# Patient Record
Sex: MALE | Race: WHITE | NOT HISPANIC OR LATINO | Employment: FULL TIME | URBAN - METROPOLITAN AREA
[De-identification: names, ages, dates, MRNs, and addresses within clinical notes are randomized per-mention and may not be internally consistent; named-entity substitution may affect disease eponyms.]

---

## 2021-04-14 ENCOUNTER — OFFICE VISIT (OUTPATIENT)
Dept: URGENT CARE | Facility: CLINIC | Age: 26
End: 2021-04-14
Payer: COMMERCIAL

## 2021-04-14 VITALS
HEART RATE: 94 BPM | TEMPERATURE: 98.4 F | RESPIRATION RATE: 16 BRPM | OXYGEN SATURATION: 99 % | HEIGHT: 67 IN | DIASTOLIC BLOOD PRESSURE: 64 MMHG | SYSTOLIC BLOOD PRESSURE: 100 MMHG | BODY MASS INDEX: 21.97 KG/M2 | WEIGHT: 140 LBS

## 2021-04-14 DIAGNOSIS — B35.3 TINEA PEDIS OF BOTH FEET: ICD-10-CM

## 2021-04-14 DIAGNOSIS — S90.829A BLISTER OF FOOT, UNSPECIFIED LATERALITY, INITIAL ENCOUNTER: ICD-10-CM

## 2021-04-14 PROCEDURE — 99202 OFFICE O/P NEW SF 15 MIN: CPT | Performed by: NURSE PRACTITIONER

## 2021-04-14 NOTE — LETTER
April 14, 2021    To Whom It May Concern:         This is confirmation that Don Chavira attended his scheduled appointment with KIRILL Nagel on 4/14/21.    Please excuse him from work today.     Sincerely,          JAX Nagel.  [phone]

## 2021-04-15 NOTE — PROGRESS NOTES
Subjective:      Don Chavira is a 25 y.o. male who presents with Foot Burn (both feet, swollen and hurts, redness and bad oddor, x3mths.)            Foot Problem  This is a new problem. Episode onset: pt reports new onset of burning rash and blisters to both feet that started 3 months ago. He states this gets worse when his feet are hot and moist. States it is now starting to be painful to walk or stand for long periods. Treatments tried: states he tried changing his socks several times per day. The treatment provided no relief.       Review of Systems   Skin:        Rash to both feet   All other systems reviewed and are negative.    History reviewed. No pertinent past medical history. History reviewed. No pertinent surgical history.   Social History     Socioeconomic History   • Marital status: Single     Spouse name: Not on file   • Number of children: Not on file   • Years of education: Not on file   • Highest education level: Not on file   Occupational History   • Not on file   Tobacco Use   • Smoking status: Never Smoker   • Smokeless tobacco: Never Used   Substance and Sexual Activity   • Alcohol use: Not on file   • Drug use: Not on file   • Sexual activity: Not on file   Other Topics Concern   • Not on file   Social History Narrative   • Not on file     Social Determinants of Health     Financial Resource Strain:    • Difficulty of Paying Living Expenses:    Food Insecurity:    • Worried About Running Out of Food in the Last Year:    • Ran Out of Food in the Last Year:    Transportation Needs:    • Lack of Transportation (Medical):    • Lack of Transportation (Non-Medical):    Physical Activity:    • Days of Exercise per Week:    • Minutes of Exercise per Session:    Stress:    • Feeling of Stress :    Social Connections:    • Frequency of Communication with Friends and Family:    • Frequency of Social Gatherings with Friends and Family:    • Attends Denominational Services:    • Active Member of Clubs or  "Organizations:    • Attends Club or Organization Meetings:    • Marital Status:    Intimate Partner Violence:    • Fear of Current or Ex-Partner:    • Emotionally Abused:    • Physically Abused:    • Sexually Abused:           Objective:     /64 (BP Location: Left arm, Patient Position: Sitting, BP Cuff Size: Adult)   Pulse 94   Temp 36.9 °C (98.4 °F) (Temporal)   Resp 16   Ht 1.702 m (5' 7\")   Wt 63.5 kg (140 lb)   SpO2 99%   BMI 21.93 kg/m²      Physical Exam  Vitals and nursing note reviewed.   Constitutional:       Appearance: Normal appearance. He is well-developed.   HENT:      Head: Normocephalic and atraumatic.      Right Ear: External ear normal.      Left Ear: External ear normal.      Nose: Nose normal.   Eyes:      Conjunctiva/sclera: Conjunctivae normal.      Pupils: Pupils are equal, round, and reactive to light.   Cardiovascular:      Rate and Rhythm: Normal rate and regular rhythm.   Pulmonary:      Effort: Pulmonary effort is normal.   Musculoskeletal:         General: Normal range of motion.      Cervical back: Normal range of motion.        Feet:    Skin:     General: Skin is warm and dry.      Capillary Refill: Capillary refill takes less than 2 seconds.   Neurological:      General: No focal deficit present.      Mental Status: He is alert and oriented to person, place, and time. Mental status is at baseline.   Psychiatric:         Mood and Affect: Mood normal.         Behavior: Behavior normal.         Thought Content: Thought content normal.                 Assessment/Plan:        1. Blister of foot, unspecified laterality, initial encounter    2. Tinea pedis of both feet    OTC lotrimin BID for 2-3 weeks  OTC lotrimin powder to shoes  Wear breathable socks, no wool  Keep feet open to air as often as possible  Must avoid excessive moisture to promote healing  Work note provided  Supportive care, differential diagnoses, and indications for immediate follow-up discussed with " patient.    Pathogenesis of diagnosis discussed including typical length and natural progression.      Instructed to return to UC or nearest emergency department if symptoms fail to improve, for any change in condition, further concerns, or new concerning symptoms.  Patient states understanding of the plan of care and discharge instructions.

## 2022-05-19 ENCOUNTER — HOSPITAL ENCOUNTER (OUTPATIENT)
Dept: LAB | Facility: MEDICAL CENTER | Age: 27
End: 2022-05-19
Attending: NURSE PRACTITIONER
Payer: COMMERCIAL

## 2022-05-19 ENCOUNTER — OFFICE VISIT (OUTPATIENT)
Dept: MEDICAL GROUP | Facility: PHYSICIAN GROUP | Age: 27
End: 2022-05-19
Payer: COMMERCIAL

## 2022-05-19 VITALS
BODY MASS INDEX: 23.07 KG/M2 | HEIGHT: 67 IN | WEIGHT: 147 LBS | OXYGEN SATURATION: 97 % | RESPIRATION RATE: 18 BRPM | DIASTOLIC BLOOD PRESSURE: 82 MMHG | HEART RATE: 81 BPM | SYSTOLIC BLOOD PRESSURE: 112 MMHG | TEMPERATURE: 98.1 F

## 2022-05-19 DIAGNOSIS — F10.10 ALCOHOL USE DISORDER, MILD, ABUSE: ICD-10-CM

## 2022-05-19 DIAGNOSIS — E29.1 HYPOGONADISM, MALE: ICD-10-CM

## 2022-05-19 DIAGNOSIS — Z00.00 WELL ADULT EXAM: ICD-10-CM

## 2022-05-19 DIAGNOSIS — N62 GYNECOMASTIA, MALE: ICD-10-CM

## 2022-05-19 DIAGNOSIS — N36.9 URETHRAL DISORDER, UNSPECIFIED: ICD-10-CM

## 2022-05-19 DIAGNOSIS — Q21.0 VSD (VENTRICULAR SEPTAL DEFECT): ICD-10-CM

## 2022-05-19 DIAGNOSIS — Q56.4: ICD-10-CM

## 2022-05-19 DIAGNOSIS — Q56.4: Primary | ICD-10-CM

## 2022-05-19 DIAGNOSIS — Z13.79 GENETIC SCREENING: ICD-10-CM

## 2022-05-19 LAB
ALBUMIN SERPL BCP-MCNC: 4.8 G/DL (ref 3.2–4.9)
ALBUMIN/GLOB SERPL: 1.6 G/DL
ALP SERPL-CCNC: 62 U/L (ref 30–99)
ALT SERPL-CCNC: 12 U/L (ref 2–50)
ANION GAP SERPL CALC-SCNC: 13 MMOL/L (ref 7–16)
AST SERPL-CCNC: 21 U/L (ref 12–45)
BASOPHILS # BLD AUTO: 0.9 % (ref 0–1.8)
BASOPHILS # BLD: 0.06 K/UL (ref 0–0.12)
BILIRUB SERPL-MCNC: 0.7 MG/DL (ref 0.1–1.5)
BUN SERPL-MCNC: 12 MG/DL (ref 8–22)
CALCIUM SERPL-MCNC: 9.5 MG/DL (ref 8.5–10.5)
CHLORIDE SERPL-SCNC: 106 MMOL/L (ref 96–112)
CO2 SERPL-SCNC: 23 MMOL/L (ref 20–33)
CREAT SERPL-MCNC: 0.7 MG/DL (ref 0.5–1.4)
EOSINOPHIL # BLD AUTO: 0.09 K/UL (ref 0–0.51)
EOSINOPHIL NFR BLD: 1.3 % (ref 0–6.9)
ERYTHROCYTE [DISTWIDTH] IN BLOOD BY AUTOMATED COUNT: 40 FL (ref 35.9–50)
ESTRADIOL SERPL-MCNC: 9.3 PG/ML
FSH SERPL-ACNC: 6.8 MIU/ML (ref 1.5–12.4)
GFR SERPLBLD CREATININE-BSD FMLA CKD-EPI: 130 ML/MIN/1.73 M 2
GLOBULIN SER CALC-MCNC: 3 G/DL (ref 1.9–3.5)
GLUCOSE SERPL-MCNC: 88 MG/DL (ref 65–99)
HCT VFR BLD AUTO: 43.3 % (ref 42–52)
HGB BLD-MCNC: 15.2 G/DL (ref 14–18)
IMM GRANULOCYTES # BLD AUTO: 0.02 K/UL (ref 0–0.11)
IMM GRANULOCYTES NFR BLD AUTO: 0.3 % (ref 0–0.9)
LH SERPL-ACNC: 4.2 IU/L (ref 1.7–8.6)
LYMPHOCYTES # BLD AUTO: 2.05 K/UL (ref 1–4.8)
LYMPHOCYTES NFR BLD: 30.7 % (ref 22–41)
MCH RBC QN AUTO: 32.7 PG (ref 27–33)
MCHC RBC AUTO-ENTMCNC: 35.1 G/DL (ref 33.7–35.3)
MCV RBC AUTO: 93.1 FL (ref 81.4–97.8)
MONOCYTES # BLD AUTO: 0.54 K/UL (ref 0–0.85)
MONOCYTES NFR BLD AUTO: 8.1 % (ref 0–13.4)
NEUTROPHILS # BLD AUTO: 3.91 K/UL (ref 1.82–7.42)
NEUTROPHILS NFR BLD: 58.7 % (ref 44–72)
NRBC # BLD AUTO: 0 K/UL
NRBC BLD-RTO: 0 /100 WBC
PLATELET # BLD AUTO: 343 K/UL (ref 164–446)
PMV BLD AUTO: 9.4 FL (ref 9–12.9)
POTASSIUM SERPL-SCNC: 4.3 MMOL/L (ref 3.6–5.5)
PROLACTIN SERPL-MCNC: 9.64 NG/ML (ref 2.1–17.7)
PROT SERPL-MCNC: 7.8 G/DL (ref 6–8.2)
RBC # BLD AUTO: 4.65 M/UL (ref 4.7–6.1)
SODIUM SERPL-SCNC: 142 MMOL/L (ref 135–145)
TSH SERPL DL<=0.005 MIU/L-ACNC: 1.55 UIU/ML (ref 0.38–5.33)
WBC # BLD AUTO: 6.7 K/UL (ref 4.8–10.8)

## 2022-05-19 PROCEDURE — 83002 ASSAY OF GONADOTROPIN (LH): CPT

## 2022-05-19 PROCEDURE — 85025 COMPLETE CBC W/AUTO DIFF WBC: CPT

## 2022-05-19 PROCEDURE — 82670 ASSAY OF TOTAL ESTRADIOL: CPT

## 2022-05-19 PROCEDURE — 84443 ASSAY THYROID STIM HORMONE: CPT

## 2022-05-19 PROCEDURE — 80053 COMPREHEN METABOLIC PANEL: CPT

## 2022-05-19 PROCEDURE — 84402 ASSAY OF FREE TESTOSTERONE: CPT

## 2022-05-19 PROCEDURE — 99204 OFFICE O/P NEW MOD 45 MIN: CPT | Performed by: NURSE PRACTITIONER

## 2022-05-19 PROCEDURE — 36415 COLL VENOUS BLD VENIPUNCTURE: CPT

## 2022-05-19 PROCEDURE — 84146 ASSAY OF PROLACTIN: CPT

## 2022-05-19 PROCEDURE — 84270 ASSAY OF SEX HORMONE GLOBUL: CPT

## 2022-05-19 PROCEDURE — 83001 ASSAY OF GONADOTROPIN (FSH): CPT

## 2022-05-19 PROCEDURE — 84403 ASSAY OF TOTAL TESTOSTERONE: CPT

## 2022-05-19 ASSESSMENT — PATIENT HEALTH QUESTIONNAIRE - PHQ9: CLINICAL INTERPRETATION OF PHQ2 SCORE: 0

## 2022-05-19 NOTE — ASSESSMENT & PLAN NOTE
Patient was born with congenital VSD.  He states he had this repaired when he was approximately 8 years old.  He has not had any issues since that time.  He denies chest pain, shortness of breath, fatigue, or exercise intolerance.  He does need to be followed by cardiology, and referral was placed for him to establish today.

## 2022-05-19 NOTE — ASSESSMENT & PLAN NOTE
Chronic condition, unstable.  Patient states that he has been drinking more than he would like to.  Occasionally he drinks 5 shots in 1 day.  He reports he has been sober for approximately 8 days as of today.  He has been through rehab in 2017.  He was sober for 7 to 8 months afterward.  He has done AA, as well as addiction counseling, and did not find much value in it.  Patient does want to consider beginning Antabuse.  Discussed with patient that could consider beginning him on Antabuse, however I would like to get labs on him prior to beginning this medication.  Patient will get his labs completed, and return for follow-up next week.

## 2022-05-19 NOTE — ASSESSMENT & PLAN NOTE
Chronic and ongoing.  Patient reports that he used to be on testosterone replacement therapy, and did feel well on this.  He discontinued testosterone replacement when he was 18.  He has not had his testosterone level checked in a couple of years, and order was placed to recheck this today.  Patient would like to be referred to endocrine, and referral was placed today.

## 2022-05-21 LAB
SHBG SERPL-SCNC: 39 NMOL/L (ref 17–56)
TESTOST FREE MFR SERPL: 1.5 % (ref 1.6–2.9)
TESTOST FREE SERPL-MCNC: 7 PG/ML (ref 47–244)
TESTOST SERPL-MCNC: 43 NG/DL (ref 300–1080)

## 2022-05-25 NOTE — ASSESSMENT & PLAN NOTE
Chronic condition, ongoing.  Pt born with congenital intersex.  He has congenital hypospadius, with 9 corrective surgeries before the age of 3.  He also had reconstructive surgery on his penis at age 15 which included urethral revision, and testicle prosthesis.  He denies any problems since that time.  He has no complaints relative to urination at this time.  He does need to establish with specialty, and referral to urology was placed for him today.

## 2022-05-26 PROBLEM — N62 GYNECOMASTIA, MALE: Status: ACTIVE | Noted: 2022-05-26

## 2022-05-26 NOTE — ASSESSMENT & PLAN NOTE
Chronic condition, ongoing.  Patient was born with congenital intersex.  Patient reports that at that time, his parents chose for him to be male.  He was born with 1 ovary, which has been removed when he was an infant.  Has undergone multiple surgeries including 9 hypospadias surgeries before the age of 3.  At age 8 he had an overgrowth in the left chamber of his heart, and a VSD repair.  He also underwent double mastectomy at age 13.  He did did have corrective surgery on his penis when he was 15, with revision of part of his urethra and testicle prosthesis.  Patient does need to be followed by specialties including urology, endocrinology, and cardiology.  All of these referrals were placed for him today.

## 2022-05-26 NOTE — PROGRESS NOTES
Subjective:     CC: The primary encounter diagnosis was Intersex disorder. Diagnoses of Hypogonadism, male, Urethral disorder, unspecified, VSD (ventricular septal defect), Alcohol use disorder, mild, abuse, Gynecomastia, male, Genetic screening, and Well adult exam were also pertinent to this visit.      HPI:   Don is a new patient to me today wanting to establish care.  Patient recently relocated to [place] from [place].  His past medical history is significant for congenital intersex disorder with multiple corrective surgeries, as well as congenital VSD, s/p repair.  We discussed the following problems:    1. Intersex disorder  Chronic and ongoing.  Patient here for evaluation today.    2. Hypogonadism, male  Chronic condition, stable. Patient here for evaluation today.    3. Urethral disorder, unspecified  Chronic condition, stable. Patient is here for evaluation today.    4. VSD (ventricular septal defect)  Chronic condition, stable. Patient is here for evaluation today.    5. Alcohol use disorder, mild, abuse  Acute condition, unstable. Patient here for evaluation today.    6. Gynecomastia, male  Acute condition, unstable. Patient here for evaluation today.     7. Genetic screening  Patient would like to participate in the [facility].  Referral was placed today.    8. Well adult exam  Labs ordered in anticipation of well adult exam.        History reviewed. No pertinent past medical history.    Social History     Tobacco Use   • Smoking status: Current Every Day Smoker     Packs/day: 0.25     Types: Cigarettes     Start date: 1/1/2017   • Smokeless tobacco: Never Used   Vaping Use   • Vaping Use: Never used   Substance Use Topics   • Alcohol use: Yes     Alcohol/week: 2.4 oz     Types: 4 Shots of liquor per week     Comment: occ   • Drug use: Never       No current [facility]-ordered outpatient medications on file.     No current [facility]-ordered facility-administered medications on file.  "      Allergies:  Patient has no known allergies.    Health Maintenance: Completed    ROS:  Gen: no fevers/chills, no changes in weight  Eyes: no changes in vision  ENT: no sore throat, no hearing loss, no bloody nose  Pulm: no sob, no cough  CV: no chest pain, no palpitations  GI: no nausea/vomiting, no diarrhea  : no dysuria  MSk: no myalgias  Skin: no rash  Neuro: no headaches, no numbness/tingling  Heme/Lymph: no easy bruising      Objective:       Exam:  /82 (BP Location: Left arm, Patient Position: Sitting, BP Cuff Size: Adult)   Pulse 81   Temp 36.7 °C (98.1 °F) (Temporal)   Resp 18   Ht 1.702 m (5' 7\")   Wt 66.7 kg (147 lb)   SpO2 97%   BMI 23.02 kg/m²  Body mass index is 23.02 kg/m².    Gen: Alert and oriented, No apparent distress.  Neck: Neck is supple without lymphadenopathy.  No carotid bruits.  Lungs: Normal effort, CTA bilaterally, no wheezes, rhonchi, or rales  CV: Regular rate and rhythm. No murmurs, rubs, or gallops.  Ext: No clubbing, cyanosis, edema.    Labs: None    Assessment & Plan:     26 y.o. male with the following -     Hypogonadism, male  Chronic and ongoing.  Patient reports that he used to be on testosterone replacement therapy, and did feel well on this.  He discontinued testosterone replacement when he was 18.  He has not had his testosterone level checked in a couple of years, and order was placed to recheck this today.  Patient would like to be referred to endocrine, and referral was placed today.        VSD (ventricular septal defect)  Patient was born with congenital VSD.  He states he had this repaired when he was approximately 8 years old.  He has not had any issues since that time.  He denies chest pain, shortness of breath, fatigue, or exercise intolerance.  He does need to be followed by cardiology, and referral was placed for him to establish today.      Alcohol use disorder, mild, abuse  Chronic condition, unstable.  Patient states that he has been drinking " more than he would like to.  Occasionally he drinks 5 shots in 1 day.  He reports he has been sober for approximately 8 days as of today.  He has been through rehab in 2017.  He was sober for 7 to 8 months afterward.  He has done AA, as well as addiction counseling, and did not find much value in it.  Patient does want to consider beginning Antabuse.  Discussed with patient that could consider beginning him on Antabuse, however I would like to get labs on him prior to beginning this medication.  Patient will get his labs completed, and return for follow-up next week.      Urethral disorder, unspecified  Chronic condition, ongoing.  Pt born with congenital intersex.  He has congenital hypospadius, with 9 corrective surgeries before the age of 3.  He also had reconstructive surgery on his penis at age 15 which included urethral revision, and testicle prosthesis.  He denies any problems since that time.  He has no complaints relative to urination at this time.  He does need to establish with specialty, and referral to urology was placed for him today.    Intersex disorder  Chronic condition, ongoing.  Patient was born with congenital intersex.  Patient reports that at that time, his parents chose for him to be male.  He was born with 1 ovary, which has been removed when he was an infant.  Has undergone multiple surgeries including 9 hypospadias surgeries before the age of 3.  At age 8 he had an overgrowth in the left chamber of his heart, and a VSD repair.  He also underwent double mastectomy at age 13.  He did did have corrective surgery on his penis when he was 15, with revision of part of his urethra and testicle prosthesis.  Patient does need to be followed by specialties including urology, endocrinology, and cardiology.  All of these referrals were placed for him today.    Gynecomastia, male  Acute condition, unstable.  Patient had a double mastectomy at the age of 13 due to congenital intersex disorder.  He has  noticed that he is feeling hard nodules and tissue irregularities, which he is concerned about.  On examination today, patient does have a palpable mass underneath the nipple of the right breast as well as dense feeling tissue.  The left breast has some small palpable masses with dense tissue as well.  Discussed further workup with breast imaging with patient and he would like to proceed.  Follow up will be based on results.       Orders Placed This Encounter   • US-BREAST BILAT-COMPLETE   • Testosterone, Free & Total, Adult Male (w/SHBG)   • CBC WITH DIFFERENTIAL   • Comp Metabolic Panel   • PROLACTIN   • FSH/LH   • ESTRADIOL   • TSH WITH REFLEX TO FT4   • Referral to Genetic Research Studies   • Referral to Endocrinology   • REFERRAL TO CARDIOLOGY   • Referral to Urology          Return in about 1 week (around 5/26/2022).    I have placed the below orders and discussed them with an approved delegating provider.  The MA is performing the below orders under the direction of Mary Denton MD.    Please note that this dictation was created using voice recognition software. I have made every reasonable attempt to correct obvious errors, but I expect that there are errors of grammar and possibly content that I did not discover before finalizing the note.

## 2022-05-26 NOTE — ASSESSMENT & PLAN NOTE
Acute condition, unstable.  Patient had a double mastectomy at the age of 13 due to congenital intersex disorder.  He has noticed that he is feeling hard nodules and tissue irregularities, which he is concerned about.  On examination today, patient does have a palpable mass underneath the nipple of the right breast as well as dense feeling tissue.  The left breast has some small palpable masses with dense tissue as well.  Discussed further workup with breast imaging with patient and he would like to proceed.  Follow up will be based on results.

## 2022-06-06 ENCOUNTER — RESEARCH ENCOUNTER (OUTPATIENT)
Dept: RESEARCH | Facility: WORKSITE | Age: 27
End: 2022-06-06
Attending: NURSE PRACTITIONER
Payer: COMMERCIAL

## 2022-06-06 DIAGNOSIS — Z00.6 RESEARCH STUDY PATIENT: ICD-10-CM

## 2022-06-16 ENCOUNTER — OFFICE VISIT (OUTPATIENT)
Dept: MEDICAL GROUP | Facility: PHYSICIAN GROUP | Age: 27
End: 2022-06-16
Payer: COMMERCIAL

## 2022-06-16 VITALS
BODY MASS INDEX: 23.29 KG/M2 | DIASTOLIC BLOOD PRESSURE: 70 MMHG | RESPIRATION RATE: 16 BRPM | SYSTOLIC BLOOD PRESSURE: 100 MMHG | WEIGHT: 148.4 LBS | OXYGEN SATURATION: 98 % | HEIGHT: 67 IN | HEART RATE: 92 BPM | TEMPERATURE: 98.4 F

## 2022-06-16 DIAGNOSIS — E29.1 HYPOGONADISM, MALE: ICD-10-CM

## 2022-06-16 DIAGNOSIS — F10.10 ALCOHOL USE DISORDER, MILD, ABUSE: Primary | ICD-10-CM

## 2022-06-16 PROCEDURE — 99214 OFFICE O/P EST MOD 30 MIN: CPT | Performed by: NURSE PRACTITIONER

## 2022-06-16 RX ORDER — DISULFIRAM 250 MG/1
250 TABLET ORAL DAILY
Qty: 30 TABLET | Refills: 2 | Status: SHIPPED | OUTPATIENT
Start: 2022-06-16 | End: 2022-09-19 | Stop reason: SDUPTHER

## 2022-06-16 ASSESSMENT — FIBROSIS 4 INDEX: FIB4 SCORE: 0.46

## 2022-06-16 NOTE — ASSESSMENT & PLAN NOTE
Chronic and ongoing.  Patient reports that he has continued to drink more alcohol than he would like.  He would like to start on disulfiram 250 mg daily.  Education provided on use of this medication, as well as the need to abstain from ETOH for at least 12 hours prior to beginning the medication.  He was educated about watching for added ETOH to basic products such as mouth wash, food additives, etc.  Patient will follow up in three months to see how he is doing.

## 2022-06-17 NOTE — PROGRESS NOTES
"Subjective:     CC: The primary encounter diagnosis was Alcohol use disorder, mild, abuse. A diagnosis of Hypogonadism, male was also pertinent to this visit.      HPI:   Don is an established patient of [facility] here for follow-up.  We discussed the following problems:    1. Alcohol use disorder, mild, abuse  Chronic condition, unstable.  Patient here for follow up.    2. Hypogonadism, male  Chronic condition, unstable. Patient is here for follow up.       History reviewed. No pertinent past medical history.    Social History     Tobacco Use   • Smoking status: Current Every Day Smoker     Packs/day: 0.25     Types: Cigarettes     Start date: 1/1/2017   • Smokeless tobacco: Never Used   Vaping Use   • Vaping Use: Never used   Substance Use Topics   • Alcohol use: Yes     Alcohol/week: 50.4 oz     Types: 84 Shots of liquor per week   • Drug use: Never       Current Outpatient Medications Ordered in Epic   Medication Sig Dispense Refill   • disulfiram (ANTABUSE) 250 MG Tab Take 1 Tablet by mouth every day. 30 Tablet 2     No current Epic-ordered facility-administered medications on file.       Allergies:  Patient has no known allergies.    Health Maintenance: Completed    ROS:  Gen: no fevers/chills, no changes in weight  Eyes: no changes in vision  ENT: no sore throat, no hearing loss, no bloody nose  Pulm: no sob, no cough  CV: no chest pain, no palpitations  GI: no nausea/vomiting, no diarrhea  : no dysuria  MSk: no myalgias  Skin: no rash  Neuro: no headaches, no numbness/tingling  Heme/Lymph: no easy bruising      Objective:       Exam:  /70 (BP Location: Right arm, Patient Position: Sitting, BP Cuff Size: Adult)   Pulse 92   Temp 36.9 °C (98.4 °F) (Temporal)   Resp 16   Ht 1.702 m (5' 7\")   Wt 67.3 kg (148 lb 6.4 oz)   SpO2 98%   BMI 23.24 kg/m²  Body mass index is 23.24 kg/m².    Gen: Alert and oriented, No apparent distress.  Neck: Neck is supple without lymphadenopathy.    Ext: No clubbing, " cyanosis, edema.    Labs: Dated: 5/19/2022, discussed results today and all questions answered.  Assessment & Plan:     26 y.o. male with the following -     Alcohol use disorder, mild, abuse  Chronic and ongoing.  Patient reports that he has continued to drink more alcohol than he would like.  He would like to start on disulfiram 250 mg daily.  Education provided on use of this medication, as well as the need to abstain from ETOH for at least 12 hours prior to beginning the medication.  He was educated about watching for added ETOH to basic products such as mouth wash, food additives, etc.  Patient will follow up in three months to see how he is doing.     Hypogonadism, male  Chronic condition, stable.  Discussed recent low testosterone levels with patient.  He does have an appointment with Mahendra at [facility], and he will discuss testosterone replacement management with him.  He will continue to follow with specialty.      Orders Placed This Encounter   • disulfiram (ANTABUSE) 250 MG Tab          Return in about 3 months (around 9/16/2022).    I have placed the below orders and discussed them with an approved delegating provider.  The MA is performing the below orders under the direction of Mary Denton MD.    Please note that this dictation was created using voice recognition software. I have made every reasonable attempt to correct obvious errors, but I expect that there are errors of grammar and possibly content that I did not discover before finalizing the note.

## 2022-06-17 NOTE — ASSESSMENT & PLAN NOTE
Chronic condition, stable.  Discussed recent low testosterone levels with patient.  He does have an appointment with Mahendra at [facility], and he will discuss testosterone replacement management with him.  He will continue to follow with specialty.

## 2022-08-29 ENCOUNTER — HOSPITAL ENCOUNTER (OUTPATIENT)
Dept: LAB | Facility: MEDICAL CENTER | Age: 27
End: 2022-08-29
Attending: PHYSICIAN ASSISTANT
Payer: COMMERCIAL

## 2022-08-29 LAB
BASOPHILS # BLD AUTO: 0.6 % (ref 0–1.8)
BASOPHILS # BLD: 0.06 K/UL (ref 0–0.12)
EOSINOPHIL # BLD AUTO: 0.12 K/UL (ref 0–0.51)
EOSINOPHIL NFR BLD: 1.2 % (ref 0–6.9)
ERYTHROCYTE [DISTWIDTH] IN BLOOD BY AUTOMATED COUNT: 40.1 FL (ref 35.9–50)
HCT VFR BLD AUTO: 43.4 % (ref 42–52)
HGB BLD-MCNC: 14.9 G/DL (ref 14–18)
IMM GRANULOCYTES # BLD AUTO: 0.03 K/UL (ref 0–0.11)
IMM GRANULOCYTES NFR BLD AUTO: 0.3 % (ref 0–0.9)
LYMPHOCYTES # BLD AUTO: 2.52 K/UL (ref 1–4.8)
LYMPHOCYTES NFR BLD: 25.5 % (ref 22–41)
MCH RBC QN AUTO: 32.3 PG (ref 27–33)
MCHC RBC AUTO-ENTMCNC: 34.3 G/DL (ref 33.7–35.3)
MCV RBC AUTO: 93.9 FL (ref 81.4–97.8)
MONOCYTES # BLD AUTO: 0.78 K/UL (ref 0–0.85)
MONOCYTES NFR BLD AUTO: 7.9 % (ref 0–13.4)
NEUTROPHILS # BLD AUTO: 6.37 K/UL (ref 1.82–7.42)
NEUTROPHILS NFR BLD: 64.5 % (ref 44–72)
NRBC # BLD AUTO: 0 K/UL
NRBC BLD-RTO: 0 /100 WBC
PLATELET # BLD AUTO: 281 K/UL (ref 164–446)
PMV BLD AUTO: 10.3 FL (ref 9–12.9)
RBC # BLD AUTO: 4.62 M/UL (ref 4.7–6.1)
TESTOST SERPL-MCNC: 740 NG/DL (ref 175–781)
WBC # BLD AUTO: 9.9 K/UL (ref 4.8–10.8)

## 2022-08-29 PROCEDURE — 36415 COLL VENOUS BLD VENIPUNCTURE: CPT

## 2022-08-29 PROCEDURE — 85025 COMPLETE CBC W/AUTO DIFF WBC: CPT

## 2022-08-29 PROCEDURE — 84403 ASSAY OF TOTAL TESTOSTERONE: CPT

## 2022-08-30 ENCOUNTER — OFFICE VISIT (OUTPATIENT)
Dept: CARDIOLOGY | Facility: MEDICAL CENTER | Age: 27
End: 2022-08-30
Attending: NURSE PRACTITIONER
Payer: COMMERCIAL

## 2022-08-30 VITALS
BODY MASS INDEX: 23.54 KG/M2 | WEIGHT: 150 LBS | HEIGHT: 67 IN | DIASTOLIC BLOOD PRESSURE: 60 MMHG | SYSTOLIC BLOOD PRESSURE: 100 MMHG | HEART RATE: 72 BPM | RESPIRATION RATE: 16 BRPM | OXYGEN SATURATION: 96 %

## 2022-08-30 DIAGNOSIS — Q21.0 VSD (VENTRICULAR SEPTAL DEFECT): ICD-10-CM

## 2022-08-30 DIAGNOSIS — R00.2 PALPITATIONS: ICD-10-CM

## 2022-08-30 LAB — EKG IMPRESSION: NORMAL

## 2022-08-30 PROCEDURE — 99204 OFFICE O/P NEW MOD 45 MIN: CPT | Mod: 25 | Performed by: INTERNAL MEDICINE

## 2022-08-30 PROCEDURE — 93000 ELECTROCARDIOGRAM COMPLETE: CPT | Performed by: INTERNAL MEDICINE

## 2022-08-30 RX ORDER — METHOCARBAMOL 750 MG/1
TABLET, FILM COATED ORAL
COMMUNITY
Start: 2022-07-01 | End: 2022-08-30

## 2022-08-30 RX ORDER — TRIAMCINOLONE ACETONIDE 1 MG/G
OINTMENT TOPICAL
COMMUNITY

## 2022-08-30 RX ORDER — TESTOSTERONE CYPIONATE 200 MG/ML
1 INJECTION, SOLUTION INTRAMUSCULAR
COMMUNITY

## 2022-08-30 ASSESSMENT — ENCOUNTER SYMPTOMS
LIGHT-HEADEDNESS: 0
BACK PAIN: 1
NECK PAIN: 1
PALPITATIONS: 1
PARESTHESIAS: 0
LOSS OF BALANCE: 0
PND: 0
SYNCOPE: 0
NIGHT SWEATS: 0
ORTHOPNEA: 0
SHORTNESS OF BREATH: 0
MYALGIAS: 0
HEADACHES: 1
SORE THROAT: 0
COUGH: 0
FEVER: 0
NUMBNESS: 0
EXCESSIVE DAYTIME SLEEPINESS: 0
WHEEZING: 0
FLANK PAIN: 0
DYSPNEA ON EXERTION: 0
CONSTIPATION: 0
NAUSEA: 0
DOUBLE VISION: 0
IRREGULAR HEARTBEAT: 0
DIZZINESS: 0
WEAKNESS: 0
BLURRED VISION: 0
DIARRHEA: 0
FALLS: 0
PHOTOPHOBIA: 1
NEAR-SYNCOPE: 0
BLOATING: 0
VOMITING: 0
SLEEP DISTURBANCES DUE TO BREATHING: 0
DECREASED APPETITE: 0
DIAPHORESIS: 0

## 2022-08-30 ASSESSMENT — FIBROSIS 4 INDEX: FIB4 SCORE: 0.56

## 2022-08-30 NOTE — PROGRESS NOTES
Cardiology Initial Consultation Note    Date of note:    8/30/2022    Primary Care Provider: KIRILL Montanez  Referring Provider: Vandana Edwards A.P.R.*     Patient Name: Don Chavira   YOB: 1995  MRN:              [id number]    Chief Complaint   Patient presents with    Other     NP Dx: VSD (ventricular septal defect)       History of Present Illness: Mr. Don Chavira is a 26 y.o. male whose current medical problems include VSD s/p repair around 8 years of age in [place], alcohol use disorder, current smoker and hypogonadism who is here for cardiac consultation for VSD.    Was born with a VSD and underwent repair at the age of 8 years.  Apparently there was a complication with damage to an artery when she was monitored with yearly echocardiograms.  Last echo was at the age of 18 and was told that his heart looked good.  Has not seen a cardiologist since then.  Previous cardiologist in [place].    Does get occasional palpitations which happens usually once a month.  Feels heart racing associated with diaphoresis and breaking into a cold sweat.  Has to lie down for symptoms to pass.  Usually self resolves.  Has had few brief syncopal events with it.    Cardiovascular Risk Factors:  1. Smoking status: Current smoker  2. Type II Diabetes Mellitus: no   3. Hypertension: no  4. Dyslipidemia: no   5. Family history of early Coronary Artery Disease in a first degree relative (Male less than 55 years of age; Female less than 65 years of age): Denies  6.  Obesity and/or Metabolic Syndrome: No  7. Sedentary lifestyle: No    Review of Systems   Constitutional: Negative for decreased appetite, diaphoresis, fever, malaise/fatigue and night sweats.   HENT:  Negative for congestion and sore throat.    Eyes:  Positive for photophobia. Negative for blurred vision and double vision.   Cardiovascular:  Positive for chest pain and palpitations. Negative for cyanosis, dyspnea on exertion,  irregular heartbeat, leg swelling, near-syncope, orthopnea, paroxysmal nocturnal dyspnea and syncope.   Respiratory:  Negative for cough, shortness of breath, sleep disturbances due to breathing and wheezing.    Endocrine: Negative for cold intolerance and heat intolerance.   Musculoskeletal:  Positive for back pain and neck pain. Negative for falls and myalgias.   Gastrointestinal:  Negative for bloating, constipation, diarrhea, nausea and vomiting.   Genitourinary:  Negative for dysuria and flank pain.   Neurological:  Positive for headaches. Negative for excessive daytime sleepiness, dizziness, light-headedness, loss of balance, numbness, paresthesias and weakness.       History reviewed. No pertinent past medical history.      History reviewed. No pertinent surgical history.      Current Outpatient Medications   Medication Sig Dispense Refill    testosterone cypionate (DEPO-TESTOSTERONE) 200 MG/ML Solution injection 1 mL.      triamcinolone acetonide (KENALOG) 0.1 % Ointment triamcinolone acetonide 0.1 % topical ointment   APPLY A THIN LAYER TO THE AFFECTED AREA(S) BY TOPICAL ROUTE 2 TIMES PER DAY      disulfiram (ANTABUSE) 250 MG Tab Take 1 Tablet by mouth every day. 30 Tablet 2     No current facility-administered medications for this visit.         No Known Allergies      Family History   Problem Relation Age of Onset    Other Mother         meningitis    Other Father         liver failure    Alcohol abuse Father     Breast Cancer Maternal Grandmother     Cancer Paternal Grandmother         lung         Social History     Socioeconomic History    Marital status: Single     Spouse name: Not on file    Number of children: Not on file    Years of education: Not on file    Highest education level: Not on file   Occupational History    Not on file   Tobacco Use    Smoking status: Every Day     Packs/day: 0.25     Types: Cigarettes     Start date: 1/1/2017    Smokeless tobacco: Never   Vaping Use    Vaping Use:  "Never used   Substance and Sexual Activity    Alcohol use: Not Currently     Alcohol/week: 50.4 oz     Types: 84 Shots of liquor per week    Drug use: Never    Sexual activity: Not Currently   Other Topics Concern    Not on file   Social History Narrative    Not on file     Social Determinants of Health     Financial Resource Strain: Not on file   Food Insecurity: Not on file   Transportation Needs: Not on file   Physical Activity: Not on file   Stress: Not on file   Social Connections: Not on file   Intimate Partner Violence: Not on file   Housing Stability: Not on file         Physical Exam:  Ambulatory Vitals  /60 (BP Location: Left arm, Patient Position: Sitting, BP Cuff Size: Adult)   Pulse 72   Resp 16   Ht 1.702 m (5' 7\")   Wt 68 kg (150 lb)   SpO2 96%    Oxygen Therapy:  Pulse Oximetry: 96 %  BP Readings from Last 4 Encounters:   08/30/22 100/60   06/16/22 100/70   05/19/22 112/82   04/14/21 100/64       Weight/BMI: Body mass index is 23.49 kg/m².  Wt Readings from Last 4 Encounters:   08/30/22 68 kg (150 lb)   06/16/22 67.3 kg (148 lb 6.4 oz)   05/19/22 66.7 kg (147 lb)   04/14/21 63.5 kg (140 lb)         General: Well appearing and in no apparent distress  Eyes: nl conjunctiva, no icteric sclera  ENT: wearing a mask, normal external appearance of ears  Neck: no visible JVP,  no carotid bruits  Lungs: normal respiratory effort, CTAB  Heart: RRR, no murmurs, no rubs or gallops,  no edema bilateral lower extremities. No LV/RV heave on cardiac palpatation. 2+ bilateral radial pulses.  2+ bilateral dp pulses.   Abdomen: soft, non tender, non distended, no masses, normal bowel sounds.  No HSM.  Extremities/MSK: no clubbing, no cyanosis  Neurological: No focal sensory deficits  Psychiatric: Appropriate affect, A/O x 3, intact judgement and insight  Skin: Warm extremities      Lab Data Review:  Lab Results   Component Value Date/Time    SODIUM 142 05/19/2022 02:51 PM    POTASSIUM 4.3 05/19/2022 02:51 PM "    CHLORIDE 106 05/19/2022 02:51 PM    CO2 23 05/19/2022 02:51 PM    GLUCOSE 88 05/19/2022 02:51 PM    BUN 12 05/19/2022 02:51 PM    CREATININE 0.70 05/19/2022 02:51 PM     Lab Results   Component Value Date/Time    ALKPHOSPHAT 62 05/19/2022 02:51 PM    ASTSGOT 21 05/19/2022 02:51 PM    ALTSGPT 12 05/19/2022 02:51 PM    TBILIRUBIN 0.7 05/19/2022 02:51 PM      Lab Results   Component Value Date/Time    WBC 9.9 08/29/2022 02:24 PM         Cardiac Imaging and Procedures Review:    EKG dated 8/30/2022: My personal interpretation is sinus rhythm, RBBB        Assessment & Plan     1. VSD (ventricular septal defect)  EKG    EC-ECHOCARDIOGRAM COMPLETE W/O CONT      2. Palpitations  EC-ECHOCARDIOGRAM COMPLETE W/O CONT    Cardiac Event Monitor            Shared Medical Decision Making:  Obtain transthoracic echocardiogram with bubble study to evaluate for intracardiac shunt with a history of VSD repair and ?damage to coronary artery with constant flow.  Patient is unclear on details.    For ongoing palpitations, suspect SVT.  Has had syncope with it.  We discussed vagal maneuvers.  Obtain 30 days cardiac event monitor to rule out any underlying arrhythmia associated with symptoms.  Encouraged patient to trigger the device and write down his symptoms to best correlate them with underlying rhythm to which he voices understanding.      All of patient's excellent questions were answered to the best of my knowledge and to his satisfaction.  It was a pleasure seeing Mr. Don Chavira in my clinic today. Return in about 1 year (around 8/30/2023). Patient is aware to call the cardiology clinic with any questions or concerns.      Stanislav Armenta MD  [facility]  [facility], [address]  [address], [place] [address]  [place] [address]  Phone: [phone]  Fax: [phone]    Please note that this dictation was created using voice recognition software. I have made every reasonable  attempt to correct obvious errors, but it is possible there are errors of grammar and possibly content that I did not discover before finalizing the note.

## 2022-09-13 ENCOUNTER — NON-PROVIDER VISIT (OUTPATIENT)
Dept: CARDIOLOGY | Facility: MEDICAL CENTER | Age: 27
End: 2022-09-13
Attending: INTERNAL MEDICINE
Payer: COMMERCIAL

## 2022-09-13 DIAGNOSIS — I47.29 NSVT (NONSUSTAINED VENTRICULAR TACHYCARDIA) (HCC): ICD-10-CM

## 2022-09-13 DIAGNOSIS — I49.3 PVCS (PREMATURE VENTRICULAR CONTRACTIONS): ICD-10-CM

## 2022-09-13 DIAGNOSIS — I49.1 PREMATURE ATRIAL CONTRACTIONS: ICD-10-CM

## 2022-09-13 NOTE — PROGRESS NOTES
Home enrollment completed for the 30 day i-[employer] Heart monitoring program per Stanislav Armenta MD..  >Monitor to be shipped to patient by [employer].  >Pending Baseline.  >Pending EOS.

## 2022-09-19 ENCOUNTER — TELEPHONE (OUTPATIENT)
Dept: CARDIOLOGY | Facility: MEDICAL CENTER | Age: 27
End: 2022-09-19
Payer: COMMERCIAL

## 2022-09-19 ENCOUNTER — PATIENT MESSAGE (OUTPATIENT)
Dept: CARDIOLOGY | Facility: MEDICAL CENTER | Age: 27
End: 2022-09-19

## 2022-09-19 ENCOUNTER — OFFICE VISIT (OUTPATIENT)
Dept: MEDICAL GROUP | Facility: PHYSICIAN GROUP | Age: 27
End: 2022-09-19
Payer: COMMERCIAL

## 2022-09-19 VITALS
OXYGEN SATURATION: 98 % | SYSTOLIC BLOOD PRESSURE: 104 MMHG | HEART RATE: 68 BPM | TEMPERATURE: 98.2 F | HEIGHT: 67 IN | BODY MASS INDEX: 23.48 KG/M2 | WEIGHT: 149.6 LBS | DIASTOLIC BLOOD PRESSURE: 60 MMHG | RESPIRATION RATE: 16 BRPM

## 2022-09-19 DIAGNOSIS — E29.1 HYPOGONADISM, MALE: Chronic | ICD-10-CM

## 2022-09-19 DIAGNOSIS — F10.10 ALCOHOL USE DISORDER, MILD, ABUSE: Primary | ICD-10-CM

## 2022-09-19 PROCEDURE — 99214 OFFICE O/P EST MOD 30 MIN: CPT | Performed by: NURSE PRACTITIONER

## 2022-09-19 RX ORDER — DISULFIRAM 250 MG/1
250 TABLET ORAL DAILY
Qty: 30 TABLET | Refills: 6 | Status: SHIPPED | OUTPATIENT
Start: 2022-09-19 | End: 2023-03-16 | Stop reason: SDUPTHER

## 2022-09-19 ASSESSMENT — FIBROSIS 4 INDEX: FIB4 SCORE: 0.56

## 2022-09-19 NOTE — TELEPHONE ENCOUNTER
Called patient home number and LM to callback or respond to [employer] message.     [employer] message sent.

## 2022-09-20 NOTE — PROGRESS NOTES
Subjective:     CC: The primary encounter diagnosis was Alcohol use disorder, mild, abuse. A diagnosis of Hypogonadism, male was also pertinent to this visit.      HPI:   Don is an established patient of mine here for follow-up.  We discussed the following problems:    1. Alcohol use disorder, mild, abuse  Chronic condition, stable. Patient is here for evaluation today.    2. Hypogonadism, male  Chronic condition, stable. Patient is here for evaluation today.       History reviewed. No pertinent past medical history.    Social History     Tobacco Use    Smoking status: Every Day     Packs/day: 0.25     Types: Cigarettes     Start date: 1/1/2017    Smokeless tobacco: Never   Vaping Use    Vaping Use: Never used   Substance Use Topics    Alcohol use: Not Currently     Alcohol/week: 50.4 oz     Types: 84 Shots of liquor per week    Drug use: Never       Current Outpatient Medications Ordered in Epic   Medication Sig Dispense Refill    disulfiram (ANTABUSE) 250 MG Tab Take 1 Tablet by mouth every day. 30 Tablet 6    testosterone cypionate (DEPO-TESTOSTERONE) 200 MG/ML Solution injection 1 mL.      triamcinolone acetonide (KENALOG) 0.1 % Ointment triamcinolone acetonide 0.1 % topical ointment   APPLY A THIN LAYER TO THE AFFECTED AREA(S) BY TOPICAL ROUTE 2 TIMES PER DAY       No current [facility]-ordered facility-administered medications on file.       Allergies:  Patient has no known allergies.    Health Maintenance: Completed    ROS:  Gen: no fevers/chills, no changes in weight  Eyes: no changes in vision  ENT: no sore throat, no hearing loss, no bloody nose  Pulm: no sob, no cough  CV: no chest pain, no palpitations  GI: no nausea/vomiting, no diarrhea  : no dysuria  MSk: no myalgias  Skin: no rash  Neuro: no headaches, no numbness/tingling  Heme/Lymph: no easy bruising      Objective:       Exam:  /60 (BP Location: Left arm, Patient Position: Sitting, BP Cuff Size: Adult)   Pulse 68   Temp 36.8 °C (98.2 °F)  "(Temporal)   Resp 16   Ht 1.702 m (5' 7\")   Wt 67.9 kg (149 lb 9.6 oz)   SpO2 98%   BMI 23.43 kg/m²  Body mass index is 23.43 kg/m².    Gen: Alert and oriented, No apparent distress. Mood and affect appropriate. Good insight.   Neck: Neck is supple without lymphadenopathy.  Ext: No clubbing, cyanosis, edema.    Labs: Dated: None    Assessment & Plan:     26 y.o. male with the following -     Alcohol use disorder, mild, abuse  Chronic condition, stable. Patient reports that he is doing well with his alcohol cessation. He has been sober for three months at this time.  He states that he is working out and feels very good.  He is taking the disulfiram more on an as needed basis when he feels like he may have the urge to drink versus taking this daily. He would like a refill of this today, which was provided.  Patient will follow up in approximately four months.     Hypogonadism, male  Chronic condition, stable.  Patient is doing well with his testosterone replacement therapy. [facility], but his testosterone has increased into the mid 700s now. Patient reports he feels better and has increased energy. He will continue to follow with Mahendra.    Orders Placed This Encounter    disulfiram (ANTABUSE) 250 MG Tab          Return in about 4 months (around 1/19/2023).    I have placed the below orders and discussed them with an approved delegating provider.  The MA is performing the below orders under the direction of Mary Denton MD.    Please note that this dictation was created using voice recognition software. I have made every reasonable attempt to correct obvious errors, but I expect that there are errors of grammar and possibly content that I did not discover before finalizing the note.  "

## 2022-09-20 NOTE — ASSESSMENT & PLAN NOTE
Chronic condition, stable.  Patient is doing well with his testosterone replacement therapy. [facility], but his testosterone has increased into the mid 700s now. Patient reports he feels better and has increased energy. He will continue to follow with Mahendra.

## 2022-09-20 NOTE — ASSESSMENT & PLAN NOTE
Chronic condition, stable. Patient reports that he is doing well with his alcohol cessation. He has been sober for three months at this time.  He states that he is working out and feels very good.  He is taking the disulfiram more on an as needed basis when he feels like he may have the urge to drink versus taking this daily. He would like a refill of this today, which was provided.  Patient will follow up in approximately four months.

## 2022-09-23 ENCOUNTER — TELEPHONE (OUTPATIENT)
Dept: CARDIOLOGY | Facility: MEDICAL CENTER | Age: 27
End: 2022-09-23
Payer: COMMERCIAL

## 2022-09-23 ENCOUNTER — TELEPHONE (OUTPATIENT)
Dept: CARDIOLOGY | Facility: MEDICAL CENTER | Age: 27
End: 2022-09-23

## 2022-09-23 NOTE — TELEPHONE ENCOUNTER
Caller: Princess Rosendo Calzada    Date and time of urgent report: 09- / 12:20pm    Reference Number: N/A    Callback Number: [phone]  Opt 1    Route to Shahriar BALLESTEROS

## 2022-09-26 NOTE — TELEPHONE ENCOUNTER
Urgent tracings reviewed.  Consistent with sinus tachycardia without ventricular arrhythmia.  Thanks.

## 2022-09-27 ENCOUNTER — TELEPHONE (OUTPATIENT)
Dept: CARDIOLOGY | Facility: MEDICAL CENTER | Age: 27
End: 2022-09-27
Payer: COMMERCIAL

## 2022-09-27 DIAGNOSIS — R00.0 SINUS TACHYCARDIA: ICD-10-CM

## 2022-09-27 DIAGNOSIS — R00.2 PALPITATIONS: ICD-10-CM

## 2022-09-27 NOTE — TELEPHONE ENCOUNTER
Received urgent cardiac event monitor report from 09/27/22 from 1226 with HR up to 205.     Called patient home number and spoke to the patient, works [employer]. Little SOB, denies lightheadedness or dizziness at time of episode. Advised will notify DA and get back to him with any recommendations. Answered all questions and concerns, appreciative of call.     DA: Any other recommendations? Do you want to add any parameters for notification, monitor has 2 more weeks? Thank you!

## 2022-09-27 NOTE — TELEPHONE ENCOUNTER
URGENT EKG    Caller: Princess Curtis    Date and time of urgent report: 09/27 @ 12:26    Reference Number: N/A    Callback Number: [phone] OPT 1    Route to Shahriar BALLESTEROS

## 2022-09-28 RX ORDER — DILTIAZEM HYDROCHLORIDE 120 MG/1
120 CAPSULE, COATED, EXTENDED RELEASE ORAL DAILY
Qty: 30 CAPSULE | Refills: 0 | Status: SHIPPED | OUTPATIENT
Start: 2022-09-28 | End: 2022-10-26

## 2022-09-28 NOTE — TELEPHONE ENCOUNTER
Looks like patient has inappropriate sinus tachycardia.  We can start him on diltiazem 120 mg daily, if he is okay with it.  Thanks.

## 2022-09-28 NOTE — TELEPHONE ENCOUNTER
Called pt [phone]  Relayed DA recommendations. Pt in agreement and would like a 30 day trial Rx for now. Pt agrees to call clinic if refill is needed. Pharmacy verified. Pt verbalized understanding and is appreciative of call back.       DILTIAZem CD (CARDIZEM CD) 120 MG CAPSULE SR 24 HR 30 Capsule 0/0 9/28/2022     Sig - Route: Take 1 Capsule by mouth every day. - Oral    Sent to pharmacy as: dilTIAZem HCl ER Coated Beads 120 MG Oral Capsule Extended Release 24 Hour (CARDIZEM CD)    Notes to Pharmacy: 30 day trial per pt request    Cosign for Ordering: Required by Stanislav Armenta M.D.    E-Prescribing Status: Receipt confirmed by pharmacy (9/28/2022 10:12 AM PDT)      Pharmacy    [id number] - [place], [place] - [phone]

## 2022-09-29 ENCOUNTER — TELEPHONE (OUTPATIENT)
Dept: CARDIOLOGY | Facility: MEDICAL CENTER | Age: 27
End: 2022-09-29
Payer: COMMERCIAL

## 2022-10-19 ENCOUNTER — TELEPHONE (OUTPATIENT)
Dept: CARDIOLOGY | Facility: MEDICAL CENTER | Age: 27
End: 2022-10-19
Payer: COMMERCIAL

## 2022-10-19 ENCOUNTER — PATIENT MESSAGE (OUTPATIENT)
Dept: CARDIOLOGY | Facility: MEDICAL CENTER | Age: 27
End: 2022-10-19

## 2022-10-19 PROCEDURE — 93268 ECG RECORD/REVIEW: CPT | Performed by: INTERNAL MEDICINE

## 2022-10-20 NOTE — TELEPHONE ENCOUNTER
----- Message from Stanislav Armenta M.D. sent at 10/19/2022  4:57 PM PDT -----  Please let the patient know that his heart rate has been better controlled since initiating diltiazem.  Would recommend continuing the medication if he has no side effects.  Thanks.

## 2023-03-02 SDOH — ECONOMIC STABILITY: TRANSPORTATION INSECURITY
IN THE PAST 12 MONTHS, HAS LACK OF TRANSPORTATION KEPT YOU FROM MEETINGS, WORK, OR FROM GETTING THINGS NEEDED FOR DAILY LIVING?: NO

## 2023-03-02 SDOH — ECONOMIC STABILITY: INCOME INSECURITY: IN THE LAST 12 MONTHS, WAS THERE A TIME WHEN YOU WERE NOT ABLE TO PAY THE MORTGAGE OR RENT ON TIME?: NO

## 2023-03-02 SDOH — ECONOMIC STABILITY: FOOD INSECURITY: WITHIN THE PAST 12 MONTHS, THE FOOD YOU BOUGHT JUST DIDN'T LAST AND YOU DIDN'T HAVE MONEY TO GET MORE.: NEVER TRUE

## 2023-03-02 SDOH — HEALTH STABILITY: PHYSICAL HEALTH: ON AVERAGE, HOW MANY MINUTES DO YOU ENGAGE IN EXERCISE AT THIS LEVEL?: 50 MIN

## 2023-03-02 SDOH — ECONOMIC STABILITY: INCOME INSECURITY: HOW HARD IS IT FOR YOU TO PAY FOR THE VERY BASICS LIKE FOOD, HOUSING, MEDICAL CARE, AND HEATING?: NOT VERY HARD

## 2023-03-02 SDOH — ECONOMIC STABILITY: FOOD INSECURITY: WITHIN THE PAST 12 MONTHS, YOU WORRIED THAT YOUR FOOD WOULD RUN OUT BEFORE YOU GOT MONEY TO BUY MORE.: NEVER TRUE

## 2023-03-02 SDOH — ECONOMIC STABILITY: HOUSING INSECURITY
IN THE LAST 12 MONTHS, WAS THERE A TIME WHEN YOU DID NOT HAVE A STEADY PLACE TO SLEEP OR SLEPT IN A SHELTER (INCLUDING NOW)?: NO

## 2023-03-02 SDOH — HEALTH STABILITY: PHYSICAL HEALTH: ON AVERAGE, HOW MANY DAYS PER WEEK DO YOU ENGAGE IN MODERATE TO STRENUOUS EXERCISE (LIKE A BRISK WALK)?: 7 DAYS

## 2023-03-02 SDOH — ECONOMIC STABILITY: TRANSPORTATION INSECURITY
IN THE PAST 12 MONTHS, HAS THE LACK OF TRANSPORTATION KEPT YOU FROM MEDICAL APPOINTMENTS OR FROM GETTING MEDICATIONS?: NO

## 2023-03-02 SDOH — ECONOMIC STABILITY: HOUSING INSECURITY: IN THE LAST 12 MONTHS, HOW MANY PLACES HAVE YOU LIVED?: 1

## 2023-03-02 ASSESSMENT — LIFESTYLE VARIABLES
SKIP TO QUESTIONS 9-10: 1
HOW MANY STANDARD DRINKS CONTAINING ALCOHOL DO YOU HAVE ON A TYPICAL DAY: PATIENT DOES NOT DRINK
HOW OFTEN DO YOU HAVE SIX OR MORE DRINKS ON ONE OCCASION: NEVER
AUDIT-C TOTAL SCORE: 0
HOW OFTEN DO YOU HAVE A DRINK CONTAINING ALCOHOL: NEVER

## 2023-03-02 ASSESSMENT — SOCIAL DETERMINANTS OF HEALTH (SDOH)
IN A TYPICAL WEEK, HOW MANY TIMES DO YOU TALK ON THE PHONE WITH FAMILY, FRIENDS, OR NEIGHBORS?: MORE THAN THREE TIMES A WEEK
ARE YOU MARRIED, WIDOWED, DIVORCED, SEPARATED, NEVER MARRIED, OR LIVING WITH A PARTNER?: NEVER MARRIED
HOW OFTEN DO YOU ATTENT MEETINGS OF THE CLUB OR ORGANIZATION YOU BELONG TO?: NEVER
HOW OFTEN DO YOU ATTEND CHURCH OR RELIGIOUS SERVICES?: NEVER
HOW OFTEN DO YOU GET TOGETHER WITH FRIENDS OR RELATIVES?: MORE THAN THREE TIMES A WEEK
DO YOU BELONG TO ANY CLUBS OR ORGANIZATIONS SUCH AS CHURCH GROUPS UNIONS, FRATERNAL OR ATHLETIC GROUPS, OR SCHOOL GROUPS?: NO

## 2023-03-15 SDOH — ECONOMIC STABILITY: INCOME INSECURITY: HOW HARD IS IT FOR YOU TO PAY FOR THE VERY BASICS LIKE FOOD, HOUSING, MEDICAL CARE, AND HEATING?: NOT VERY HARD

## 2023-03-15 SDOH — HEALTH STABILITY: PHYSICAL HEALTH: ON AVERAGE, HOW MANY MINUTES DO YOU ENGAGE IN EXERCISE AT THIS LEVEL?: 50 MIN

## 2023-03-15 SDOH — ECONOMIC STABILITY: FOOD INSECURITY: WITHIN THE PAST 12 MONTHS, YOU WORRIED THAT YOUR FOOD WOULD RUN OUT BEFORE YOU GOT MONEY TO BUY MORE.: NEVER TRUE

## 2023-03-15 SDOH — HEALTH STABILITY: PHYSICAL HEALTH: ON AVERAGE, HOW MANY DAYS PER WEEK DO YOU ENGAGE IN MODERATE TO STRENUOUS EXERCISE (LIKE A BRISK WALK)?: 7 DAYS

## 2023-03-15 SDOH — ECONOMIC STABILITY: FOOD INSECURITY: WITHIN THE PAST 12 MONTHS, THE FOOD YOU BOUGHT JUST DIDN'T LAST AND YOU DIDN'T HAVE MONEY TO GET MORE.: NEVER TRUE

## 2023-03-15 SDOH — ECONOMIC STABILITY: INCOME INSECURITY: IN THE LAST 12 MONTHS, WAS THERE A TIME WHEN YOU WERE NOT ABLE TO PAY THE MORTGAGE OR RENT ON TIME?: NO

## 2023-03-15 SDOH — ECONOMIC STABILITY: HOUSING INSECURITY: IN THE LAST 12 MONTHS, HOW MANY PLACES HAVE YOU LIVED?: 1

## 2023-03-15 SDOH — HEALTH STABILITY: MENTAL HEALTH
STRESS IS WHEN SOMEONE FEELS TENSE, NERVOUS, ANXIOUS, OR CAN'T SLEEP AT NIGHT BECAUSE THEIR MIND IS TROUBLED. HOW STRESSED ARE YOU?: ONLY A LITTLE

## 2023-03-15 SDOH — ECONOMIC STABILITY: TRANSPORTATION INSECURITY
IN THE PAST 12 MONTHS, HAS LACK OF RELIABLE TRANSPORTATION KEPT YOU FROM MEDICAL APPOINTMENTS, MEETINGS, WORK OR FROM GETTING THINGS NEEDED FOR DAILY LIVING?: NO

## 2023-03-15 ASSESSMENT — SOCIAL DETERMINANTS OF HEALTH (SDOH)
HOW OFTEN DO YOU ATTENT MEETINGS OF THE CLUB OR ORGANIZATION YOU BELONG TO?: NEVER
HOW HARD IS IT FOR YOU TO PAY FOR THE VERY BASICS LIKE FOOD, HOUSING, MEDICAL CARE, AND HEATING?: NOT VERY HARD
HOW OFTEN DO YOU HAVE SIX OR MORE DRINKS ON ONE OCCASION: NEVER
DO YOU BELONG TO ANY CLUBS OR ORGANIZATIONS SUCH AS CHURCH GROUPS UNIONS, FRATERNAL OR ATHLETIC GROUPS, OR SCHOOL GROUPS?: NO
HOW OFTEN DO YOU GET TOGETHER WITH FRIENDS OR RELATIVES?: MORE THAN THREE TIMES A WEEK
IN A TYPICAL WEEK, HOW MANY TIMES DO YOU TALK ON THE PHONE WITH FAMILY, FRIENDS, OR NEIGHBORS?: MORE THAN THREE TIMES A WEEK
HOW MANY DRINKS CONTAINING ALCOHOL DO YOU HAVE ON A TYPICAL DAY WHEN YOU ARE DRINKING: PATIENT DOES NOT DRINK
IN A TYPICAL WEEK, HOW MANY TIMES DO YOU TALK ON THE PHONE WITH FAMILY, FRIENDS, OR NEIGHBORS?: MORE THAN THREE TIMES A WEEK
WITHIN THE PAST 12 MONTHS, YOU WORRIED THAT YOUR FOOD WOULD RUN OUT BEFORE YOU GOT THE MONEY TO BUY MORE: NEVER TRUE
HOW OFTEN DO YOU HAVE A DRINK CONTAINING ALCOHOL: NEVER
HOW OFTEN DO YOU GET TOGETHER WITH FRIENDS OR RELATIVES?: MORE THAN THREE TIMES A WEEK
ARE YOU MARRIED, WIDOWED, DIVORCED, SEPARATED, NEVER MARRIED, OR LIVING WITH A PARTNER?: NEVER MARRIED
HOW OFTEN DO YOU ATTEND CHURCH OR RELIGIOUS SERVICES?: NEVER
ARE YOU MARRIED, WIDOWED, DIVORCED, SEPARATED, NEVER MARRIED, OR LIVING WITH A PARTNER?: NEVER MARRIED
HOW OFTEN DO YOU ATTENT MEETINGS OF THE CLUB OR ORGANIZATION YOU BELONG TO?: NEVER
HOW OFTEN DO YOU ATTEND CHURCH OR RELIGIOUS SERVICES?: NEVER
DO YOU BELONG TO ANY CLUBS OR ORGANIZATIONS SUCH AS CHURCH GROUPS UNIONS, FRATERNAL OR ATHLETIC GROUPS, OR SCHOOL GROUPS?: NO

## 2023-03-15 ASSESSMENT — LIFESTYLE VARIABLES
HOW MANY STANDARD DRINKS CONTAINING ALCOHOL DO YOU HAVE ON A TYPICAL DAY: PATIENT DOES NOT DRINK
AUDIT-C TOTAL SCORE: 0
HOW OFTEN DO YOU HAVE SIX OR MORE DRINKS ON ONE OCCASION: NEVER
HOW OFTEN DO YOU HAVE A DRINK CONTAINING ALCOHOL: NEVER
SKIP TO QUESTIONS 9-10: 1

## 2023-03-16 ENCOUNTER — OFFICE VISIT (OUTPATIENT)
Dept: MEDICAL GROUP | Facility: PHYSICIAN GROUP | Age: 28
End: 2023-03-16
Payer: COMMERCIAL

## 2023-03-16 VITALS
TEMPERATURE: 98.6 F | BODY MASS INDEX: 22.44 KG/M2 | DIASTOLIC BLOOD PRESSURE: 66 MMHG | RESPIRATION RATE: 20 BRPM | OXYGEN SATURATION: 100 % | HEART RATE: 72 BPM | HEIGHT: 67 IN | SYSTOLIC BLOOD PRESSURE: 106 MMHG | WEIGHT: 143 LBS

## 2023-03-16 DIAGNOSIS — E29.1 HYPOGONADISM, MALE: Chronic | ICD-10-CM

## 2023-03-16 DIAGNOSIS — F10.10 ALCOHOL USE DISORDER, MILD, ABUSE: ICD-10-CM

## 2023-03-16 DIAGNOSIS — Q21.0 VSD (VENTRICULAR SEPTAL DEFECT): ICD-10-CM

## 2023-03-16 PROCEDURE — 99214 OFFICE O/P EST MOD 30 MIN: CPT

## 2023-03-16 RX ORDER — DISULFIRAM 250 MG/1
250 TABLET ORAL DAILY
Qty: 90 TABLET | Refills: 2 | Status: SHIPPED | OUTPATIENT
Start: 2023-03-16 | End: 2024-02-05 | Stop reason: SDUPTHER

## 2023-03-16 ASSESSMENT — FIBROSIS 4 INDEX: FIB4 SCORE: 0.58

## 2023-03-16 ASSESSMENT — PATIENT HEALTH QUESTIONNAIRE - PHQ9: CLINICAL INTERPRETATION OF PHQ2 SCORE: 0

## 2023-03-16 NOTE — ASSESSMENT & PLAN NOTE
Patient reports that he is continuing to see Mahendra and getting testosterone 200mg/mL, 1 injection every 2 weeks. He gets annual labs done and monitoring through urology.

## 2023-03-16 NOTE — PROGRESS NOTES
"CC:   Chief Complaint   Patient presents with    hospitals Care        HISTORY OF PRESENT ILLNESS: Patient is a 27 y.o. male established patient who presents today to discuss the following problems below:     Hypogonadism, male  Patient reports that he is continuing to see Mahendra and getting testosterone 200mg/mL, 1 injection every 2 weeks. He gets annual labs done and monitoring through urology.     Alcohol use disorder, mild, abuse  Patient reports that he has maintained limited alcohol use, only drinks for special occasions.  He does have disulfiram 250 mg daily that he continues to take, but does stop this medication prior to a special occasion where he knows he will be consuming alcohol.  He feels that his alcohol intake is well controlled at this point    VSD (ventricular septal defect)  Patient did establish with cardiology, he had runs of SVTs and has had episodes of V tach. He is electing to hold off on the echocardiogram due to costs. Denies chest pain, occasional shortness of breath, no exercise intolerance.     Review of Systems: Otherwise negative except for as stated above.      Exam: /66 (BP Location: Left arm, Patient Position: Sitting, BP Cuff Size: Adult)   Pulse 72   Temp 37 °C (98.6 °F) (Temporal)   Resp 20   Ht 1.702 m (5' 7\")   Wt 64.9 kg (143 lb)   SpO2 100%  Body mass index is 22.4 kg/m².    Physical Exam  Constitutional:       Appearance: Normal appearance.   Cardiovascular:      Rate and Rhythm: Normal rate and regular rhythm.      Heart sounds: Normal heart sounds.   Pulmonary:      Effort: Pulmonary effort is normal.      Breath sounds: Normal breath sounds.   Musculoskeletal:      Cervical back: Normal range of motion and neck supple.   Lymphadenopathy:      Cervical: No cervical adenopathy.   Neurological:      General: No focal deficit present.      Mental Status: He is alert and oriented to person, place, and time.       Assessment/Plan:  27 y.o. male with the following " -    1. Hypogonadism, male  Chronic, stable.  [facility]  Patient continues on testosterone injections every other week.  He reports that he has his labs monitored with Mahendra, so we will hold off on ordering a CBC or PSA for now    2. Alcohol use disorder, mild, abuse  Chronic, stable.  Well managed with Antabuse 250 mg daily.  Refill provided today  - disulfiram (ANTABUSE) 250 MG Tab; Take 1 Tablet by mouth every day.  Dispense: 90 Tablet; Refill: 2    3. VSD (ventricular septal defect)  Chronic, stable.  Patient is managed by cardiology.  Next plan will be to undergo an echocardiogram, he is still recovering from some cost associated with long-term cardiac monitoring, but plans to schedule this later this year.  No red flag symptoms thus far        Follow-up: Return in about 1 year (around 3/16/2024) for AWV.    Health Maintenance: Completed      Please note that this dictation was created using voice recognition software. I have made every reasonable attempt to correct obvious errors, but I expect that there are errors of grammar and possibly content that I did not discover before finalizing the note.    Electronically signed by LILIANA Plata on March 16, 2023

## 2023-03-16 NOTE — ASSESSMENT & PLAN NOTE
Patient reports that he has maintained limited alcohol use, only drinks for special occasions.  He does have disulfiram 250 mg daily that he continues to take, but does stop this medication prior to a special occasion where he knows he will be consuming alcohol.  He feels that his alcohol intake is well controlled at this point

## 2023-03-16 NOTE — ASSESSMENT & PLAN NOTE
Patient did establish with cardiology, he had runs of SVTs and has had episodes of V tach. He is electing to hold off on the echocardiogram due to costs. Denies chest pain, occasional shortness of breath, no exercise intolerance.

## 2024-02-05 DIAGNOSIS — F10.10 ALCOHOL USE DISORDER, MILD, ABUSE: ICD-10-CM

## 2024-02-05 RX ORDER — DISULFIRAM 250 MG/1
250 TABLET ORAL DAILY
Qty: 90 TABLET | Refills: 0 | Status: SHIPPED | OUTPATIENT
Start: 2024-02-05 | End: 2024-03-26

## 2024-02-05 NOTE — TELEPHONE ENCOUNTER
30 days supply given, will need an appointment for further refills. Please call [phone] to schedule

## 2024-02-05 NOTE — TELEPHONE ENCOUNTER
Received request via: Pharmacy    Was the patient seen in the last year in this department? Yes    Does the patient have an active prescription (recently filled or refills available) for medication(s) requested? No    Pharmacy Name: [facility]    Does the patient have correction Plus and need 100 day supply (blood pressure, diabetes and cholesterol meds only)? Patient does not have SCP

## 2024-02-09 ENCOUNTER — HOSPITAL ENCOUNTER (OUTPATIENT)
Dept: LAB | Facility: MEDICAL CENTER | Age: 29
End: 2024-02-09
Attending: PHYSICIAN ASSISTANT
Payer: COMMERCIAL

## 2024-02-09 PROCEDURE — 84403 ASSAY OF TOTAL TESTOSTERONE: CPT

## 2024-02-09 PROCEDURE — 36415 COLL VENOUS BLD VENIPUNCTURE: CPT

## 2024-02-09 PROCEDURE — 82670 ASSAY OF TOTAL ESTRADIOL: CPT

## 2024-02-09 PROCEDURE — 85025 COMPLETE CBC W/AUTO DIFF WBC: CPT

## 2024-02-10 LAB
BASOPHILS # BLD AUTO: 0.9 % (ref 0–1.8)
BASOPHILS # BLD: 0.07 K/UL (ref 0–0.12)
EOSINOPHIL # BLD AUTO: 0.15 K/UL (ref 0–0.51)
EOSINOPHIL NFR BLD: 1.9 % (ref 0–6.9)
ERYTHROCYTE [DISTWIDTH] IN BLOOD BY AUTOMATED COUNT: 39.4 FL (ref 35.9–50)
ESTRADIOL SERPL-MCNC: 31.9 PG/ML
HCT VFR BLD AUTO: 44.5 % (ref 42–52)
HGB BLD-MCNC: 15.8 G/DL (ref 14–18)
IMM GRANULOCYTES # BLD AUTO: 0.01 K/UL (ref 0–0.11)
IMM GRANULOCYTES NFR BLD AUTO: 0.1 % (ref 0–0.9)
LYMPHOCYTES # BLD AUTO: 2.73 K/UL (ref 1–4.8)
LYMPHOCYTES NFR BLD: 35.2 % (ref 22–41)
MCH RBC QN AUTO: 32.6 PG (ref 27–33)
MCHC RBC AUTO-ENTMCNC: 35.5 G/DL (ref 32.3–36.5)
MCV RBC AUTO: 91.9 FL (ref 81.4–97.8)
MONOCYTES # BLD AUTO: 0.59 K/UL (ref 0–0.85)
MONOCYTES NFR BLD AUTO: 7.6 % (ref 0–13.4)
NEUTROPHILS # BLD AUTO: 4.21 K/UL (ref 1.82–7.42)
NEUTROPHILS NFR BLD: 54.3 % (ref 44–72)
NRBC # BLD AUTO: 0 K/UL
NRBC BLD-RTO: 0 /100 WBC (ref 0–0.2)
PLATELET # BLD AUTO: 329 K/UL (ref 164–446)
PMV BLD AUTO: 9.2 FL (ref 9–12.9)
RBC # BLD AUTO: 4.84 M/UL (ref 4.7–6.1)
TESTOST SERPL-MCNC: 523 NG/DL (ref 175–781)
WBC # BLD AUTO: 7.8 K/UL (ref 4.8–10.8)

## 2024-08-15 ENCOUNTER — APPOINTMENT (RX ONLY)
Dept: URBAN - METROPOLITAN AREA CLINIC 4 | Facility: CLINIC | Age: 29
Setting detail: DERMATOLOGY
End: 2024-08-15

## 2024-08-15 DIAGNOSIS — L40.0 PSORIASIS VULGARIS: ICD-10-CM

## 2024-08-15 PROCEDURE — 99203 OFFICE O/P NEW LOW 30 MIN: CPT

## 2024-08-15 PROCEDURE — ? PRESCRIPTION

## 2024-08-15 PROCEDURE — ? COUNSELING

## 2024-08-15 RX ORDER — CALCITRIOL 3 UG/G
OINTMENT TOPICAL
Qty: 100 | Refills: 2 | Status: ERX | COMMUNITY
Start: 2024-08-15

## 2024-08-15 RX ORDER — CLOBETASOL PROPIONATE 0.5 MG/G
CREAM TOPICAL BID
Qty: 60 | Refills: 2 | Status: ERX | COMMUNITY
Start: 2024-08-15

## 2024-08-15 RX ADMIN — CALCITRIOL: 3 OINTMENT TOPICAL at 00:00

## 2024-08-15 RX ADMIN — CLOBETASOL PROPIONATE: 0.5 CREAM TOPICAL at 00:00

## 2024-08-15 ASSESSMENT — LOCATION DETAILED DESCRIPTION DERM
LOCATION DETAILED: LEFT ELBOW
LOCATION DETAILED: LEFT PROXIMAL POSTERIOR THIGH
LOCATION DETAILED: LEFT BUTTOCK
LOCATION DETAILED: MID-OCCIPITAL SCALP
LOCATION DETAILED: RIGHT ELBOW

## 2024-08-15 ASSESSMENT — LOCATION ZONE DERM
LOCATION ZONE: SCALP
LOCATION ZONE: LEG
LOCATION ZONE: ARM
LOCATION ZONE: TRUNK

## 2024-08-15 ASSESSMENT — LOCATION SIMPLE DESCRIPTION DERM
LOCATION SIMPLE: LEFT BUTTOCK
LOCATION SIMPLE: RIGHT ELBOW
LOCATION SIMPLE: POSTERIOR SCALP
LOCATION SIMPLE: LEFT ELBOW
LOCATION SIMPLE: LEFT POSTERIOR THIGH

## 2024-08-26 ENCOUNTER — RX ONLY (OUTPATIENT)
Age: 29
Setting detail: RX ONLY
End: 2024-08-26

## 2024-08-26 RX ORDER — ROFLUMILAST 3 MG/G
CREAM TOPICAL
Qty: 60 | Refills: 2 | Status: ERX | COMMUNITY
Start: 2024-08-25

## 2024-10-24 ENCOUNTER — TELEPHONE (OUTPATIENT)
Dept: MEDICAL GROUP | Facility: PHYSICIAN GROUP | Age: 29
End: 2024-10-24
Payer: COMMERCIAL

## 2024-12-04 ENCOUNTER — OFFICE VISIT (OUTPATIENT)
Dept: MEDICAL GROUP | Facility: PHYSICIAN GROUP | Age: 29
End: 2024-12-04
Payer: COMMERCIAL

## 2024-12-04 VITALS
HEIGHT: 67 IN | SYSTOLIC BLOOD PRESSURE: 104 MMHG | TEMPERATURE: 98.7 F | DIASTOLIC BLOOD PRESSURE: 66 MMHG | OXYGEN SATURATION: 99 % | WEIGHT: 153.2 LBS | BODY MASS INDEX: 24.04 KG/M2 | HEART RATE: 69 BPM | RESPIRATION RATE: 15 BRPM

## 2024-12-04 DIAGNOSIS — F51.01 PRIMARY INSOMNIA: ICD-10-CM

## 2024-12-04 DIAGNOSIS — Z11.59 NEED FOR HEPATITIS C SCREENING TEST: ICD-10-CM

## 2024-12-04 DIAGNOSIS — Z00.00 WELLNESS EXAMINATION: ICD-10-CM

## 2024-12-04 DIAGNOSIS — F10.10 ALCOHOL USE DISORDER, MILD, ABUSE: Chronic | ICD-10-CM

## 2024-12-04 DIAGNOSIS — Z11.4 SCREENING FOR HIV (HUMAN IMMUNODEFICIENCY VIRUS): ICD-10-CM

## 2024-12-04 PROCEDURE — 3078F DIAST BP <80 MM HG: CPT

## 2024-12-04 PROCEDURE — 3074F SYST BP LT 130 MM HG: CPT

## 2024-12-04 PROCEDURE — 99395 PREV VISIT EST AGE 18-39: CPT

## 2024-12-04 RX ORDER — TRAZODONE HYDROCHLORIDE 50 MG/1
25-50 TABLET, FILM COATED ORAL NIGHTLY
Qty: 60 TABLET | Refills: 3 | Status: SHIPPED | OUTPATIENT
Start: 2024-12-04

## 2024-12-04 RX ORDER — NALTREXONE HYDROCHLORIDE 50 MG/1
50 TABLET, FILM COATED ORAL
Qty: 30 TABLET | Refills: 2 | Status: SHIPPED | OUTPATIENT
Start: 2024-12-04

## 2024-12-04 ASSESSMENT — PATIENT HEALTH QUESTIONNAIRE - PHQ9
CLINICAL INTERPRETATION OF PHQ2 SCORE: 2
5. POOR APPETITE OR OVEREATING: 0 - NOT AT ALL
SUM OF ALL RESPONSES TO PHQ QUESTIONS 1-9: 9

## 2024-12-04 NOTE — PROGRESS NOTES
Subjective:     CC:   Chief Complaint   Patient presents with    Annual Exam       HPI:   Don Chavira is a 29 y.o. male who presents for an annual exam. He is feeling well and has no complaints.    Health Maintenance  Advanced directive: NA   PT/vit D for falls prevention: NA   Cholesterol Screening: Ordered   Diabetes Screening: Ordered   AAA Screening: NA   Aspirin Use: NA      Anticipatory Guidance  Diet: Healthy eating   Exercise: Active job, trying to get back into regular gym routine   Substance Abuse: Hx etoh abuse, on disulfiram    Safe in relationship.   Seat belts, bike helmet, gun safety discussed.  Sun protection used.    Cancer screening  Colorectal Cancer Screening: Counseled, 45    Lung Cancer Screening: NA    Prostate Cancer Screening/PSA: Counseled, 55     Infectious disease screening/Immunizations  --STI Screening: None  --Practices safe sex.  --HIV Screening: Ordered   --Hepatitis C Screening: Ordered   --Immunizations:    Influenza: Declines    HPV:  NA    Tetanus: NA    Shingles: n/a    Pneumococcal : NA                Other immunizations: Hep B, declines     He  has no past medical history on file.  He  has no past surgical history on file.  Family History   Problem Relation Age of Onset    Other Mother         meningitis    Other Father         liver failure    Alcohol abuse Father     Breast Cancer Maternal Grandmother     Cancer Paternal Grandmother         lung     Social History     Tobacco Use    Smoking status: Former     Current packs/day: 0.00     Types: Cigarettes     Start date: 2017     Quit date:      Years since quittin.9    Smokeless tobacco: Never   Vaping Use    Vaping status: Never Used   Substance Use Topics    Alcohol use: Not Currently     Alcohol/week: 50.4 oz     Types: 84 Shots of liquor per week    Drug use: Never       Patient Active Problem List    Diagnosis Date Noted    Gynecomastia, male 2022    Hypogonadism, male 2022    VSD  "(ventricular septal defect) 05/19/2022    Urethral disorder, unspecified 05/19/2022    Intersex disorder 05/19/2022    Alcohol use disorder, mild, abuse 05/19/2022       Current Outpatient Medications   Medication Sig Dispense Refill    naltrexone (DEPADE) 50 MG Tab Take 1 Tablet by mouth 1 time a day as needed (prior to alcohol consumption). 30 Tablet 2    traZODone (DESYREL) 50 MG Tab Take 0.5-1 Tablets by mouth every evening. 60 Tablet 3    disulfiram (ANTABUSE) 250 MG Tab TAKE 1 TABLET BY MOUTH EVERY DAY 90 Tablet 0    testosterone cypionate (DEPO-TESTOSTERONE) 200 MG/ML Solution injection 1 mL.      triamcinolone acetonide (KENALOG) 0.1 % Ointment triamcinolone acetonide 0.1 % topical ointment   APPLY A THIN LAYER TO THE AFFECTED AREA(S) BY TOPICAL ROUTE 2 TIMES PER DAY      clindamycin (CLEOCIN T) 1 % Gel  (Patient not taking: Reported on 12/4/2024)       No current facility-administered medications for this visit.    (including changes today)  Allergies: Patient has no known allergies.    Review of Systems   Constitutional: Negative for fever, chills and malaise/fatigue.   HENT: Negative for congestion.    Eyes: Negative for pain.   Respiratory: Negative for cough and shortness of breath.    Cardiovascular: Negative for leg swelling.   Gastrointestinal: Negative for nausea, vomiting, abdominal pain and diarrhea.   Genitourinary: Negative for dysuria and hematuria.   Skin: Negative for rash.   Neurological: Negative for dizziness, focal weakness and headaches.   Endo/Heme/Allergies: Does not bruise/bleed easily.   Psychiatric/Behavioral: Negative for depression.  The patient is not nervous/anxious.      Objective:     /66   Pulse 69   Temp 37.1 °C (98.7 °F) (Temporal)   Resp 15   Ht 1.702 m (5' 7\")   Wt 69.5 kg (153 lb 3.2 oz)   SpO2 99%   BMI 23.99 kg/m²   Body mass index is 23.99 kg/m².  Wt Readings from Last 4 Encounters:   12/04/24 69.5 kg (153 lb 3.2 oz)   03/16/23 64.9 kg (143 lb)   09/19/22 " 67.9 kg (149 lb 9.6 oz)   08/30/22 68 kg (150 lb)       Physical Exam:  Constitutional: Well-developed and well-nourished. Not diaphoretic. No distress.   Skin: Skin is warm and dry. No rash noted.  Head: Atraumatic without lesions.  Eyes: Conjunctivae and extraocular motions are normal. Pupils are equal, round, and reactive to light. No scleral icterus.   Ears:  External ears unremarkable. Tympanic membranes clear and intact.  Nose: Nares patent. Septum midline. Turbinates without erythema nor edema. No discharge.   Mouth/Throat: Dentition is intact. Tongue normal. Oropharynx is clear and moist. Posterior pharynx without erythema or exudates.  Neck: Supple, trachea midline. Normal range of motion. No thyromegaly present. No lymphadenopathy--cervical or supraclavicular.  Cardiovascular: Regular rate and rhythm, S1 and S2 without murmur, rubs, or gallops.    Lungs: Effort normal. Clear to auscultation throughout. No adventitious sounds. No CVA tenderness.  Abdomen: Soft, non tender, and without distention. Active bowel sounds in all four quadrants. No rebound, guarding, masses or HSM.  : Genitalia: Deferred  Extremities: No cyanosis, clubbing, erythema, nor edema. Distal pulses intact and symmetric.   Musculoskeletal: All major joints AROM full in all directions without pain.  Neurological: Alert and oriented x 3. DTRs 2+/3 and symmetric. No cranial nerve deficit. 5/5 myotomes. Sensation intact.  Psychiatric:  Behavior, mood, and affect are appropriate.    A chaperone was offered to the patient during today's exam. Patient declined chaperone.    Assessment and Plan:     Problem List Items Addressed This Visit       Alcohol use disorder, mild, abuse (Chronic)    Relevant Medications    naltrexone (DEPADE) 50 MG Tab     Other Visit Diagnoses       Wellness examination        Relevant Orders    CBC WITHOUT DIFFERENTIAL    Lipid Profile    TSH WITH REFLEX TO FT4    Comp Metabolic Panel    Screening for HIV (human  immunodeficiency virus)        Relevant Orders    HIV AG/AB COMBO ASSAY SCREENING    Need for hepatitis C screening test        Relevant Orders    HEP C VIRUS ANTIBODY    Primary insomnia        Relevant Medications    traZODone (DESYREL) 50 MG Tab          Patient struggling with new episode of insomnia ongoing x 3 weeks + mild depression associated with loss of father and friends in November and December. Discussed sleep habits, trial of trazodone for sleep. Due to disulfiram shortage, sent in trial of naltrexone 50mg 1 time daily as needed for alcohol craving.   Interested in therapy, will send message with new insurance info in January to send referral out.     HCM: UTD.  Labs per orders.  Vaccinations per orders.  Counseling about diet, supplements, exercise, skin care and safe sex.    Follow-up: Return in about 1 year (around 12/4/2025) for AWV.

## 2025-01-14 DIAGNOSIS — F10.10 ALCOHOL USE DISORDER, MILD, ABUSE: ICD-10-CM

## 2025-01-15 NOTE — TELEPHONE ENCOUNTER
Received request via: Pharmacy    Was the patient seen in the last year in this department? Yes    Does the patient have an active prescription (recently filled or refills available) for medication(s) requested? No    Ph[facility] Name[facility]    Does the patient have jail Plus and need 100-day supply? (This applies to ALL medications) Patient does not have SCP

## 2025-01-16 RX ORDER — DISULFIRAM 250 MG/1
250 TABLET ORAL DAILY
Qty: 90 TABLET | Refills: 0 | Status: SHIPPED | OUTPATIENT
Start: 2025-01-16 | End: 2025-01-28 | Stop reason: SDUPTHER

## 2025-01-28 DIAGNOSIS — F10.10 ALCOHOL USE DISORDER, MILD, ABUSE: ICD-10-CM

## 2025-01-28 RX ORDER — DISULFIRAM 250 MG/1
250 TABLET ORAL DAILY
Qty: 90 TABLET | Refills: 0 | Status: SHIPPED | OUTPATIENT
Start: 2025-01-28

## 2025-02-18 ENCOUNTER — APPOINTMENT (OUTPATIENT)
Dept: URBAN - METROPOLITAN AREA CLINIC 4 | Facility: CLINIC | Age: 30
Setting detail: DERMATOLOGY
End: 2025-02-18

## 2025-02-18 DIAGNOSIS — L40.0 PSORIASIS VULGARIS: ICD-10-CM

## 2025-02-18 PROCEDURE — 99213 OFFICE O/P EST LOW 20 MIN: CPT

## 2025-02-18 PROCEDURE — ? PRESCRIPTION

## 2025-02-18 PROCEDURE — ? COUNSELING

## 2025-02-18 RX ORDER — CLOBETASOL PROPIONATE 0.5 MG/ML
SOLUTION TOPICAL BID
Qty: 50 | Refills: 2 | Status: ERX | COMMUNITY
Start: 2025-02-18

## 2025-02-18 RX ORDER — CLOBETASOL PROPIONATE 0.5 MG/G
CREAM TOPICAL BID
Qty: 60 | Refills: 2 | Status: CANCELLED

## 2025-02-18 RX ADMIN — CLOBETASOL PROPIONATE: 0.5 SOLUTION TOPICAL at 00:00

## 2025-02-18 ASSESSMENT — LOCATION SIMPLE DESCRIPTION DERM
LOCATION SIMPLE: LEFT BUTTOCK
LOCATION SIMPLE: RIGHT ELBOW
LOCATION SIMPLE: LEFT ELBOW
LOCATION SIMPLE: POSTERIOR SCALP
LOCATION SIMPLE: LEFT POSTERIOR THIGH

## 2025-02-18 ASSESSMENT — LOCATION DETAILED DESCRIPTION DERM
LOCATION DETAILED: LEFT BUTTOCK
LOCATION DETAILED: RIGHT ELBOW
LOCATION DETAILED: MID-OCCIPITAL SCALP
LOCATION DETAILED: LEFT PROXIMAL POSTERIOR THIGH
LOCATION DETAILED: LEFT ELBOW

## 2025-02-18 ASSESSMENT — LOCATION ZONE DERM
LOCATION ZONE: SCALP
LOCATION ZONE: ARM
LOCATION ZONE: TRUNK
LOCATION ZONE: LEG

## 2025-02-22 ENCOUNTER — HOSPITAL ENCOUNTER (OUTPATIENT)
Dept: LAB | Facility: MEDICAL CENTER | Age: 30
End: 2025-02-22
Payer: COMMERCIAL

## 2025-02-22 ENCOUNTER — HOSPITAL ENCOUNTER (OUTPATIENT)
Dept: LAB | Facility: MEDICAL CENTER | Age: 30
End: 2025-02-22
Attending: PHYSICIAN ASSISTANT
Payer: COMMERCIAL

## 2025-02-22 DIAGNOSIS — Z11.4 SCREENING FOR HIV (HUMAN IMMUNODEFICIENCY VIRUS): ICD-10-CM

## 2025-02-22 DIAGNOSIS — Z00.00 WELLNESS EXAMINATION: ICD-10-CM

## 2025-02-22 DIAGNOSIS — Z11.59 NEED FOR HEPATITIS C SCREENING TEST: ICD-10-CM

## 2025-02-22 LAB
ALBUMIN SERPL BCP-MCNC: 4.6 G/DL (ref 3.2–4.9)
ALBUMIN/GLOB SERPL: 1.6 G/DL
ALP SERPL-CCNC: 53 U/L (ref 30–99)
ALT SERPL-CCNC: 18 U/L (ref 2–50)
ANION GAP SERPL CALC-SCNC: 12 MMOL/L (ref 7–16)
AST SERPL-CCNC: 23 U/L (ref 12–45)
BASOPHILS # BLD AUTO: 0.8 % (ref 0–1.8)
BASOPHILS # BLD: 0.05 K/UL (ref 0–0.12)
BILIRUB SERPL-MCNC: 0.4 MG/DL (ref 0.1–1.5)
BUN SERPL-MCNC: 18 MG/DL (ref 8–22)
CALCIUM ALBUM COR SERPL-MCNC: 9.2 MG/DL (ref 8.5–10.5)
CALCIUM SERPL-MCNC: 9.7 MG/DL (ref 8.5–10.5)
CHLORIDE SERPL-SCNC: 105 MMOL/L (ref 96–112)
CHOLEST SERPL-MCNC: 183 MG/DL (ref 100–199)
CO2 SERPL-SCNC: 23 MMOL/L (ref 20–33)
CREAT SERPL-MCNC: 0.91 MG/DL (ref 0.5–1.4)
EOSINOPHIL # BLD AUTO: 0.08 K/UL (ref 0–0.51)
EOSINOPHIL NFR BLD: 1.2 % (ref 0–6.9)
ERYTHROCYTE [DISTWIDTH] IN BLOOD BY AUTOMATED COUNT: 38.4 FL (ref 35.9–50)
ERYTHROCYTE [DISTWIDTH] IN BLOOD BY AUTOMATED COUNT: 38.7 FL (ref 35.9–50)
ESTRADIOL SERPL-MCNC: 39.7 PG/ML
FASTING STATUS PATIENT QL REPORTED: NORMAL
GFR SERPLBLD CREATININE-BSD FMLA CKD-EPI: 117 ML/MIN/1.73 M 2
GLOBULIN SER CALC-MCNC: 2.9 G/DL (ref 1.9–3.5)
GLUCOSE SERPL-MCNC: 98 MG/DL (ref 65–99)
HCT VFR BLD AUTO: 45.6 % (ref 42–52)
HCT VFR BLD AUTO: 45.7 % (ref 42–52)
HCV AB SER QL: NORMAL
HDLC SERPL-MCNC: 43 MG/DL
HGB BLD-MCNC: 16 G/DL (ref 14–18)
HGB BLD-MCNC: 16.1 G/DL (ref 14–18)
HIV 1+2 AB+HIV1 P24 AG SERPL QL IA: NORMAL
IMM GRANULOCYTES # BLD AUTO: 0.01 K/UL (ref 0–0.11)
IMM GRANULOCYTES NFR BLD AUTO: 0.2 % (ref 0–0.9)
LDLC SERPL CALC-MCNC: 118 MG/DL
LYMPHOCYTES # BLD AUTO: 2.26 K/UL (ref 1–4.8)
LYMPHOCYTES NFR BLD: 33.9 % (ref 22–41)
MCH RBC QN AUTO: 31.7 PG (ref 27–33)
MCH RBC QN AUTO: 31.8 PG (ref 27–33)
MCHC RBC AUTO-ENTMCNC: 35.1 G/DL (ref 32.3–36.5)
MCHC RBC AUTO-ENTMCNC: 35.2 G/DL (ref 32.3–36.5)
MCV RBC AUTO: 90.1 FL (ref 81.4–97.8)
MCV RBC AUTO: 90.5 FL (ref 81.4–97.8)
MONOCYTES # BLD AUTO: 0.58 K/UL (ref 0–0.85)
MONOCYTES NFR BLD AUTO: 8.7 % (ref 0–13.4)
NEUTROPHILS # BLD AUTO: 3.68 K/UL (ref 1.82–7.42)
NEUTROPHILS NFR BLD: 55.2 % (ref 44–72)
NRBC # BLD AUTO: 0 K/UL
NRBC BLD-RTO: 0 /100 WBC (ref 0–0.2)
PLATELET # BLD AUTO: 271 K/UL (ref 164–446)
PLATELET # BLD AUTO: 276 K/UL (ref 164–446)
PMV BLD AUTO: 9.5 FL (ref 9–12.9)
PMV BLD AUTO: 9.6 FL (ref 9–12.9)
POTASSIUM SERPL-SCNC: 3.8 MMOL/L (ref 3.6–5.5)
PROT SERPL-MCNC: 7.5 G/DL (ref 6–8.2)
RBC # BLD AUTO: 5.04 M/UL (ref 4.7–6.1)
RBC # BLD AUTO: 5.07 M/UL (ref 4.7–6.1)
SODIUM SERPL-SCNC: 140 MMOL/L (ref 135–145)
TESTOST SERPL-MCNC: 1357 NG/DL (ref 175–781)
TRIGL SERPL-MCNC: 108 MG/DL (ref 0–149)
TSH SERPL DL<=0.005 MIU/L-ACNC: 1.67 UIU/ML (ref 0.38–5.33)
WBC # BLD AUTO: 6.6 K/UL (ref 4.8–10.8)
WBC # BLD AUTO: 6.7 K/UL (ref 4.8–10.8)

## 2025-02-22 PROCEDURE — 86803 HEPATITIS C AB TEST: CPT

## 2025-02-22 PROCEDURE — 80053 COMPREHEN METABOLIC PANEL: CPT

## 2025-02-22 PROCEDURE — 85025 COMPLETE CBC W/AUTO DIFF WBC: CPT

## 2025-02-22 PROCEDURE — 87389 HIV-1 AG W/HIV-1&-2 AB AG IA: CPT

## 2025-02-22 PROCEDURE — 36415 COLL VENOUS BLD VENIPUNCTURE: CPT

## 2025-02-22 PROCEDURE — 82670 ASSAY OF TOTAL ESTRADIOL: CPT

## 2025-02-22 PROCEDURE — 85027 COMPLETE CBC AUTOMATED: CPT

## 2025-02-22 PROCEDURE — 80061 LIPID PANEL: CPT

## 2025-02-22 PROCEDURE — 84403 ASSAY OF TOTAL TESTOSTERONE: CPT

## 2025-02-22 PROCEDURE — 84443 ASSAY THYROID STIM HORMONE: CPT

## 2025-02-25 DIAGNOSIS — F10.10 ALCOHOL USE DISORDER, MILD, ABUSE: Chronic | ICD-10-CM

## 2025-02-25 NOTE — TELEPHONE ENCOUNTER
Received request via: Pharmacy    Was the patient seen in the last year in this department? Yes    Does the patient have an active prescription (recently filled or refills available) for medication(s) requested? No    Pharmacy Name: [id number] - [place], [place] - [address] AT [facility] [address]     Does the patient have shelter Plus and need 100-day supply? (This applies to ALL medications) Patient does not have SCP

## 2025-02-27 ENCOUNTER — RESULTS FOLLOW-UP (OUTPATIENT)
Dept: MEDICAL GROUP | Facility: PHYSICIAN GROUP | Age: 30
End: 2025-02-27
Payer: COMMERCIAL

## 2025-02-27 RX ORDER — NALTREXONE HYDROCHLORIDE 50 MG/1
TABLET, FILM COATED ORAL
Qty: 90 TABLET | Refills: 2 | Status: SHIPPED | OUTPATIENT
Start: 2025-02-27

## 2025-05-02 DIAGNOSIS — F10.10 ALCOHOL USE DISORDER, MILD, ABUSE: ICD-10-CM

## 2025-05-02 NOTE — TELEPHONE ENCOUNTER
Received request via: Pharmacy    Was the patient seen in the last year in this department? Yes    Does the patient have an active prescription (recently filled or refills available) for medication(s) requested? No    Pharmacy Name: [facility]    Does the patient have retirement Plus and need 100-day supply? (This applies to ALL medications) Patient does not have SCP

## 2025-05-05 RX ORDER — DISULFIRAM 250 MG/1
250 TABLET ORAL DAILY
Qty: 90 TABLET | Refills: 3 | Status: SHIPPED | OUTPATIENT
Start: 2025-05-05

## 2025-05-27 DIAGNOSIS — F51.01 PRIMARY INSOMNIA: ICD-10-CM

## 2025-05-27 NOTE — TELEPHONE ENCOUNTER
Received request via: Pharmacy    Was the patient seen in the last year in this department? Yes    Does the patient have an active prescription (recently filled or refills available) for medication(s) requested? Has active rx but it does not meet the 90 day requirement by insurance.    Pharmacy Name:   [id number] - [place], [place] - [address] [facility] [address]  [address] [place] [address]  Phone: [phone] Fax: [phone]    Does the patient have skilled nursing Plus and need 100-day supply? (This applies to ALL medications) Patient does not have SCP

## 2025-05-29 RX ORDER — TRAZODONE HYDROCHLORIDE 50 MG/1
25-50 TABLET ORAL NIGHTLY
Qty: 90 TABLET | Refills: 3 | Status: SHIPPED | OUTPATIENT
Start: 2025-05-29